# Patient Record
Sex: FEMALE | Race: BLACK OR AFRICAN AMERICAN | NOT HISPANIC OR LATINO | Employment: UNEMPLOYED | URBAN - METROPOLITAN AREA
[De-identification: names, ages, dates, MRNs, and addresses within clinical notes are randomized per-mention and may not be internally consistent; named-entity substitution may affect disease eponyms.]

---

## 2020-09-13 ENCOUNTER — HOSPITAL ENCOUNTER (EMERGENCY)
Facility: HOSPITAL | Age: 25
Discharge: HOME | End: 2020-09-13
Attending: EMERGENCY MEDICINE | Admitting: EMERGENCY MEDICINE

## 2020-09-13 VITALS
WEIGHT: 240 LBS | TEMPERATURE: 97.6 F | HEART RATE: 114 BPM | DIASTOLIC BLOOD PRESSURE: 108 MMHG | BODY MASS INDEX: 33.6 KG/M2 | HEIGHT: 71 IN | RESPIRATION RATE: 18 BRPM | SYSTOLIC BLOOD PRESSURE: 169 MMHG | OXYGEN SATURATION: 96 %

## 2020-09-13 DIAGNOSIS — E01.0 THYROMEGALY: ICD-10-CM

## 2020-09-13 DIAGNOSIS — K08.89 PAIN, DENTAL: Primary | ICD-10-CM

## 2020-09-13 PROCEDURE — 96372 THER/PROPH/DIAG INJ SC/IM: CPT

## 2020-09-13 PROCEDURE — 63710000001 ONDANSETRON ODT 4 MG TABLET DISPERSIBLE: Performed by: NURSE PRACTITIONER

## 2020-09-13 PROCEDURE — 25010000002 KETOROLAC TROMETHAMINE PER 15 MG: Performed by: NURSE PRACTITIONER

## 2020-09-13 PROCEDURE — 99283 EMERGENCY DEPT VISIT LOW MDM: CPT

## 2020-09-13 RX ORDER — CHLORHEXIDINE GLUCONATE 0.12 MG/ML
15 RINSE ORAL 4 TIMES DAILY
Qty: 473 ML | Refills: 0 | Status: SHIPPED | OUTPATIENT
Start: 2020-09-13

## 2020-09-13 RX ORDER — IBUPROFEN 600 MG/1
600 TABLET ORAL EVERY 6 HOURS PRN
Qty: 30 TABLET | Refills: 0 | Status: SHIPPED | OUTPATIENT
Start: 2020-09-13

## 2020-09-13 RX ORDER — ALUMINA, MAGNESIA, AND SIMETHICONE 2400; 2400; 240 MG/30ML; MG/30ML; MG/30ML
15 SUSPENSION ORAL ONCE
Status: COMPLETED | OUTPATIENT
Start: 2020-09-13 | End: 2020-09-13

## 2020-09-13 RX ORDER — KETOROLAC TROMETHAMINE 30 MG/ML
30 INJECTION, SOLUTION INTRAMUSCULAR; INTRAVENOUS ONCE
Status: COMPLETED | OUTPATIENT
Start: 2020-09-13 | End: 2020-09-13

## 2020-09-13 RX ORDER — LIDOCAINE HYDROCHLORIDE 20 MG/ML
15 SOLUTION OROPHARYNGEAL ONCE
Status: COMPLETED | OUTPATIENT
Start: 2020-09-13 | End: 2020-09-13

## 2020-09-13 RX ORDER — ONDANSETRON 4 MG/1
4 TABLET, ORALLY DISINTEGRATING ORAL ONCE
Status: COMPLETED | OUTPATIENT
Start: 2020-09-13 | End: 2020-09-13

## 2020-09-13 RX ORDER — HYDROCODONE BITARTRATE AND ACETAMINOPHEN 7.5; 325 MG/1; MG/1
1 TABLET ORAL ONCE
Status: COMPLETED | OUTPATIENT
Start: 2020-09-13 | End: 2020-09-13

## 2020-09-13 RX ADMIN — ONDANSETRON 4 MG: 4 TABLET, ORALLY DISINTEGRATING ORAL at 03:16

## 2020-09-13 RX ADMIN — ALUMINUM HYDROXIDE, MAGNESIUM HYDROXIDE, AND DIMETHICONE 15 ML: 400; 400; 40 SUSPENSION ORAL at 03:15

## 2020-09-13 RX ADMIN — KETOROLAC TROMETHAMINE 30 MG: 30 INJECTION, SOLUTION INTRAMUSCULAR at 03:16

## 2020-09-13 RX ADMIN — HYDROCODONE BITARTRATE AND ACETAMINOPHEN 1 TABLET: 7.5; 325 TABLET ORAL at 03:15

## 2020-09-13 RX ADMIN — LIDOCAINE HYDROCHLORIDE 15 ML: 20 SOLUTION ORAL; TOPICAL at 03:15

## 2020-09-13 NOTE — ED PROVIDER NOTES
Brief history of present illness: 25-year-old female has had dental pain for several months reports that she just cannot take it anymore.  She [facility] in [facility] for likely root canal.  No fever, difficulty swallowing.    Physical exam:     ED Triage Vitals [09/13/20 0259]   Temp Heart Rate Resp BP SpO2   97.6 °F (36.4 °C) 114 18 (!) 169/108 96 %      Temp src Heart Rate Source Patient Position BP Location FiO2 (%)   Tympanic Monitor Standing Right arm --     Nontoxic, not ill-appearing.  Normal intraoral exam other than dental decay noted left mandibular first molar and premolar.  No clear evidence of abscess.  No sublingual tenderness or mass.  No evidence of Amor angina.  Patient does have a rather prominent thyroid.  Pink warm and well-perfused with no respiratory distress.    MDM: Agree with plan for pain control and dental follow-up.  Also recommend PCP follow-up for thyromegaly.    I have seen and personally evaluated this patient, discussed the case with the treating advanced practice provider, and reviewed their note. I was involved in the medical decision making during the evaluation, testing and disposition planning for this patient.     Jose Ramon Negro MD  09/13/20 0067

## 2020-09-13 NOTE — ED TRIAGE NOTES
To ER via [facility].  C/o pain to bottom left tooth.   has had problems for months, but was unable to get it fixed.  [facility] her dentist would not do root canal while she was pregnant.  Delivered in June.  [facility] has dental appt on Tuesday.    Pt in mask at time of triage.  Triage staff in appropriate PPE.

## 2020-09-13 NOTE — DISCHARGE INSTRUCTIONS
"Please follow up with your PCP regarding enlarged Thyroid    Take Tylenol (500 or 650 mg) every 6 hours WITH a 600 mg Ibuprofen    Use \"dental balls\" to help with dental pain    Return Precautions    Although you are being discharged from the ED today, I encourage you to return for worsening symptoms.  Things can, and do, change such that treatment at home with medication may not be adequate.      Specifically, return for any of the following:    Chest pain, shortness of breath, pain or nausea and vomiting not controlled by medications provided.    Please make a follow up with your Primary Care Provider for a blood pressure recheck.     "

## 2020-09-13 NOTE — ED NOTES
Pt verbalized understanding to f/u with her dentist and to return to the ER if symptoms are worse.      Michael Ureña RN  09/13/20 0345

## 2020-09-13 NOTE — ED NOTES
Pt was in mask throughout encounter. I wore PPE while in room including gloves, face mask, and goggles. Hand hygiene was performed before and after donning and doffing PPE.       Dexter Orourke  09/13/20 7479

## 2020-09-13 NOTE — ED PROVIDER NOTES
EMERGENCY DEPARTMENT ENCOUNTER    Room Number:  06/06  Date seen:  9/13/2020  Time seen: 03:03 EDT  PCP: Provider, No Known  Historian: patient    HPI:  Chief complaint:left lower dental pain  A complete HPI/ROS/PMH/PSH/SH/FH are unobtainable due to: n/a  Context:Sammy Ro is a 25 y.o. female who presents to the ED with c/o left lower dental pain described as moderate and [facility].   She has been taking Ibuprofen and Tylenol at home for dental pain without much improvement.  States she just had a baby late June 2020 and this delayed her getting dental care as they would not do any procedures until she delivered.  She denies fever/chills.  She has no problems opening her mouth and no facial swelling.      Patient was placed in face mask in first look. Patient was wearing facemask when I entered the room and throughout our encounter. I wore full protective equipment throughout this patient encounter including a face mask, eye shield and gloves. Hand hygiene/washing of hands was performed before donning protective equipment and after removal when leaving the room.    MEDICAL RECORD REVIEW    ALLERGIES  Patient has no known allergies.    PAST MEDICAL HISTORY  Active Ambulatory Problems     Diagnosis Date Noted   • No Active Ambulatory Problems     Resolved Ambulatory Problems     Diagnosis Date Noted   • No Resolved Ambulatory Problems     No Additional Past Medical History       PAST SURGICAL HISTORY  No past surgical history on file.    FAMILY HISTORY  No family history on file.    SOCIAL HISTORY  Social History     Socioeconomic History   • Marital status: Single     Spouse name: Not on file   • Number of children: Not on file   • Years of education: Not on file   • Highest education level: Not on file       REVIEW OF SYSTEMS  Review of Systems    All systems reviewed and negative except for those discussed in HPI.     PHYSICAL  EXAM    ED Triage Vitals [09/13/20 0259]   Temp Heart Rate Resp BP SpO2   97.6 °F (36.4 °C) 114 18 (!) 169/108 96 %      Temp src Heart Rate Source Patient Position BP Location FiO2 (%)   Tympanic Monitor Standing Right arm --     Physical Exam    I have reviewed the triage vital signs and nursing notes.      GENERAL: not distressed  HENT: nares patent, mm moist, no trismus, broken tooth at number 21, no periapical abscess, no facial edema, breath is not malodorous. No tonsillar edema, posterior oral pharynx erythema or exudates. Right TM is slightly reddened, left TM is normal  EYES: no scleral icterus  NECK: no ROM limitations  CV: regular rhythm, regular rate, no murmur, no rubs, no gallups  RESPIRATORY: normal effort, CTAB  ABDOMEN: soft  : deferred  MUSCULOSKELETAL: no deformity  NEURO: alert, moves all extremities, follows commands  SKIN: warm, dry          PROGRESS, DATA ANALYSIS, CONSULTS AND MEDICAL DECISION MAKING  All labs have been independently reviewed by me.  All radiology studies have been reviewed by me and discussed with radiologist dictating the report.  EKG's independently viewed and interpreted by me unless stated otherwise. Discussion below represents my analysis of pertinent findings related to patient's condition, differential diagnosis, treatment plan and final disposition.        DDX: dental pain, dental abscess    MDM;  No trismus, no periapical abscess, no facial swelling, no sensation of throat closing and denies trouble breathing. She has dental appointment on Tuesday.    0316: Reviewed pt's history and workup with Dr. Negro.  After a bedside evaluation, Dr. Negro agrees with the plan of care.    The patient's history, physical exam, and lab findings were discussed with the physician, who also performed a face to face history and physical exam.  I discussed all results and noted any abnormalities with patient.  Discussed absoute need to recheck abnormalities with their family  "physician.  I answered any of the patient's questions.  Discussed plan for discharge, as there is no emergent indication for admission.  Pt is agreeable and understands need for follow up and repeat testing.  Pt is aware that discharge does not mean that nothing is wrong but it indicates no emergency is present and they must continue care with their family physician.  Pt is discharged with instructions to follow up with primary care doctor to have their blood pressure rechecked.           Disposition vitals:  BP (!) 169/108 (BP Location: Right arm, Patient Position: Standing)   Pulse 114   Temp 97.6 °F (36.4 °C) (Tympanic)   Resp 18   Ht 180.3 cm (71\")   Wt 109 kg (240 lb)   LMP  (Within Weeks)   SpO2 96%   Breastfeeding No   BMI 33.47 kg/m²       DIAGNOSIS  Final diagnoses:   Pain, dental   Thyromegaly       FOLLOW UP   [facility]               Luz Linton, APRN  09/13/20 0511    "